# Patient Record
Sex: FEMALE | Race: WHITE | NOT HISPANIC OR LATINO | Employment: FULL TIME | URBAN - METROPOLITAN AREA
[De-identification: names, ages, dates, MRNs, and addresses within clinical notes are randomized per-mention and may not be internally consistent; named-entity substitution may affect disease eponyms.]

---

## 2017-09-05 ENCOUNTER — HOSPITAL ENCOUNTER (OUTPATIENT)
Dept: MAMMOGRAPHY | Facility: CLINIC | Age: 47
Discharge: HOME | End: 2017-09-05
Attending: OBSTETRICS & GYNECOLOGY | Admitting: OBSTETRICS & GYNECOLOGY
Payer: COMMERCIAL

## 2017-09-05 DIAGNOSIS — Z12.31 ENCOUNTER FOR SCREENING MAMMOGRAM FOR HIGH-RISK PATIENT: ICD-10-CM

## 2017-09-05 PROCEDURE — G0202 SCR MAMMO BI INCL CAD: HCPCS

## 2017-09-19 ENCOUNTER — HOSPITAL ENCOUNTER (OUTPATIENT)
Dept: MAMMOGRAPHY | Facility: CLINIC | Age: 47
Discharge: HOME | End: 2017-09-19
Attending: OBSTETRICS & GYNECOLOGY | Admitting: OBSTETRICS & GYNECOLOGY
Payer: COMMERCIAL

## 2017-09-19 ENCOUNTER — HOSPITAL ENCOUNTER (OUTPATIENT)
Dept: ULTRASOUND IMAGING | Facility: CLINIC | Age: 47
End: 2017-09-19
Attending: OBSTETRICS & GYNECOLOGY
Payer: COMMERCIAL

## 2017-09-19 DIAGNOSIS — R92.8 ABNORMAL MAMMOGRAM: ICD-10-CM

## 2017-09-19 PROCEDURE — G0206 DX MAMMO INCL CAD UNI: HCPCS

## 2017-09-19 PROCEDURE — 76642 ULTRASOUND BREAST LIMITED: CPT | Mod: RT

## 2018-08-28 ENCOUNTER — RADIANT APPOINTMENT (OUTPATIENT)
Dept: GENERAL RADIOLOGY | Facility: CLINIC | Age: 48
End: 2018-08-28
Attending: INTERNAL MEDICINE
Payer: COMMERCIAL

## 2018-08-28 ENCOUNTER — OFFICE VISIT (OUTPATIENT)
Dept: INTERNAL MEDICINE | Facility: CLINIC | Age: 48
End: 2018-08-28
Payer: COMMERCIAL

## 2018-08-28 VITALS
DIASTOLIC BLOOD PRESSURE: 70 MMHG | BODY MASS INDEX: 34.36 KG/M2 | RESPIRATION RATE: 16 BRPM | HEART RATE: 70 BPM | HEIGHT: 63 IN | TEMPERATURE: 98.9 F | OXYGEN SATURATION: 98 % | WEIGHT: 193.9 LBS | SYSTOLIC BLOOD PRESSURE: 106 MMHG

## 2018-08-28 DIAGNOSIS — M25.571 ACUTE RIGHT ANKLE PAIN: Primary | ICD-10-CM

## 2018-08-28 DIAGNOSIS — M25.571 ACUTE RIGHT ANKLE PAIN: ICD-10-CM

## 2018-08-28 PROCEDURE — 99203 OFFICE O/P NEW LOW 30 MIN: CPT | Performed by: INTERNAL MEDICINE

## 2018-08-28 PROCEDURE — 73610 X-RAY EXAM OF ANKLE: CPT | Mod: RT

## 2018-08-28 NOTE — MR AVS SNAPSHOT
After Visit Summary   8/28/2018    Ruth Michel    MRN: [id number]           Patient Information     Date Of Birth          1970        Visit Information        Provider Department      8/28/2018 3:15 PM Guerda Batres MD [facility]        Today's Diagnoses     Acute left ankle pain    -  1      Care Instructions    Hard boot - please wear while weight bearing for next 1-2 weeks (unless otherwise specified).    Xrays downstairs today.           Follow-ups after your visit        Future tests that were ordered for you today     Open Future Orders        Priority Expected Expires Ordered    XR Ankle Left G/E 3 Views Routine 8/28/2018 8/28/2019 8/28/2018            Who to contact     If you have questions or need follow up information about today's clinic visit or your schedule please contact [facility] directly at [phone].  Normal or non-critical lab and imaging results will be communicated to you by [employer], letter or phone within 4 business days after the clinic has received the results. If you do not hear from us within 7 days, please contact the clinic through [employer] or phone. If you have a critical or abnormal lab result, we will notify you by phone as soon as possible.  Submit refill requests through [employer] or call your pharmacy and they will forward the refill request to us. Please allow 3 business days for your refill to be completed.          Additional Information About Your Visit        MyChart Information     [employer] gives you secure access to your electronic health record. If you see a primary care provider, you can also send messages to your care team and make appointments. If you have questions, please call your primary care clinic.  If you do not have a primary care provider, please call [phone] and they will assist you.        Care EveryWhere ID     This is your Care EveryWhere ID. This could be used by other  organizations to access your [facility] records  [id number]        Your Vitals Were     Pulse Temperature Respirations Last Period Pulse Oximetry       70 98.9  F (37.2  C) (Oral) 16 07/26/2018 98%        Blood Pressure from Last 3 Encounters:   08/28/18 106/70   12/05/05 114/70   08/17/05 130/70    Weight from Last 3 Encounters:   08/28/18 193 lb 14.4 oz (88 kg)   12/05/05 146 lb 11.2 oz (66.5 kg)   05/24/05 145 lb 1.6 oz (65.8 kg)                 Today's Medication Changes          These changes are accurate as of 8/28/18  3:19 PM.  If you have any questions, ask your nurse or doctor.               Start taking these medicines.        Dose/Directions    order for DME   Used for:  Acute left ankle pain   Started by:  Guerda Batres MD        Equipment being ordered: CAM boot   Quantity:  1 Units   Refills:  0            Where to get your medicines      Some of these will need a paper prescription and others can be bought over the counter.  Ask your nurse if you have questions.     Bring a paper prescription for each of these medications     order for DME                Primary Care Provider Fax #    Physician No Ref-Primary [phone]       No address on file        Equal Access to Services     JOYCE REDDING : Esthela Mclain, wacolinda aleidaadaha, qaybta kaalmada adejulietteda, america olea. So [facility] [phone].    ATENCIÓN: Si habla español, tiene a sosa disposición servicios gratuitos de asistencia lingüística. Llame al [phone].    We comply with applicable federal civil rights laws and [place] laws. We do not discriminate on the basis of race, color, national origin, age, disability, sex, sexual orientation, or gender identity.            Thank you!     Thank you for choosing [facility]  for your care. Our goal is always to provide you with excellent care. Hearing back from our patients is one way we can continue to improve our  services. Please take a few minutes to complete the written survey that you may receive in the mail after your visit with us. Thank you!             Your Updated Medication List - Protect others around you: Learn how to safely use, store and throw away your medicines at www.disposemymeds.org.          This list is accurate as of 8/28/18  3:19 PM.  Always use your most recent med list.                   Brand Name Dispense Instructions for use Diagnosis    calcium 500 1250 (500 Ca) MG Tabs tablet   Generic drug:  calcium carbonate 500 mg {elemental}      2 daily        LEVOTHYROXINE SODIUM PO      Take 50 mcg by mouth daily        MULTIVITAMIN PO      None Entered        order for DME     1 Units    Equipment being ordered: CAM boot    Acute left ankle pain

## 2018-08-28 NOTE — PATIENT INSTRUCTIONS
Hard boot - please wear while weight bearing for next 1-2 weeks (unless otherwise specified).    Xrays downstairs today.

## 2018-08-28 NOTE — PROGRESS NOTES
"  SUBJECTIVE:                                                      HPI: Ruth Michel is a pleasant 47 year old female who presents with right ankle pain:    - started yesterday  - indicates right medial malleolus  - pain only occurs with weightbearing, not present otherwise  - was mowing the lawn on Sunday, but does not recall any misstep, twist, or trauma    No prior history of right ankle injuries, sprains, or surgeries.    PMH significant for obesity.    The medication, allergy, and problem lists have been reviewed and updated as appropriate.       OBJECTIVE:                                                      /70  Pulse 70  Temp 98.9  F (37.2  C) (Oral)  Resp 16  Ht 5' 2.5\" (1.588 m)  Wt 193 lb 14.4 oz (88 kg)  LMP 07/26/2018  SpO2 98%  BMI 34.9 kg/m2  Constitutional: well-appearing  Right ankle: mild swelling around medial malleolus, but no deformity, redness, warmth, or skin changes; mild tenderness to palpation of medial malleolus; no pain with active and passive range of motion exercises  Right tib/fib: no deformity or pain with compression    ASSESSMENT/PLAN:                                                      (M25.571) Acute right ankle pain  (primary encounter diagnosis)  Comment: did not tolerate CAM boot.  Plan:    - right ankle x-ray series today.   - patient to use crutches/remain nonweightbearing for at least 1-2 weeks.    The instructions on the AVS were discussed and explained to the patient. Patient expressed understanding of instructions.    A total of 20 minutes were spent face-to-face with this patient during this encounter and over half of that time was spent on counseling and coordination of care re: above diagnoses and plans of care.     (Chart documentation was completed, in part, with [employer] voice-recognition software. Even though reviewed, some grammatical, spelling, and word errors may remain.)    Guerda Batres MD   [place] [id number] " JOSE J [address] [place] [id number]  T: [phone], F: [phone]

## 2018-09-02 ENCOUNTER — HEALTH MAINTENANCE LETTER (OUTPATIENT)
Age: 48
End: 2018-09-02

## 2018-10-04 ENCOUNTER — HOSPITAL ENCOUNTER (OUTPATIENT)
Dept: MAMMOGRAPHY | Facility: CLINIC | Age: 48
Discharge: HOME | End: 2018-10-04
Attending: OBSTETRICS & GYNECOLOGY | Admitting: OBSTETRICS & GYNECOLOGY
Payer: COMMERCIAL

## 2018-10-04 DIAGNOSIS — Z12.39 BREAST CANCER SCREENING: ICD-10-CM

## 2018-10-04 PROCEDURE — 77067 SCR MAMMO BI INCL CAD: CPT

## 2019-10-10 ENCOUNTER — HOSPITAL ENCOUNTER (OUTPATIENT)
Dept: MAMMOGRAPHY | Facility: CLINIC | Age: 49
Discharge: HOME | End: 2019-10-10
Attending: OBSTETRICS & GYNECOLOGY | Admitting: OBSTETRICS & GYNECOLOGY
Payer: COMMERCIAL

## 2019-10-10 DIAGNOSIS — Z12.31 VISIT FOR SCREENING MAMMOGRAM: ICD-10-CM

## 2019-10-10 PROCEDURE — 77067 SCR MAMMO BI INCL CAD: CPT

## 2020-02-10 ENCOUNTER — HEALTH MAINTENANCE LETTER (OUTPATIENT)
Age: 50
End: 2020-02-10

## 2021-01-07 ENCOUNTER — HOSPITAL ENCOUNTER (OUTPATIENT)
Dept: MAMMOGRAPHY | Facility: CLINIC | Age: 51
Discharge: HOME | End: 2021-01-07
Attending: OBSTETRICS & GYNECOLOGY | Admitting: OBSTETRICS & GYNECOLOGY
Payer: COMMERCIAL

## 2021-01-07 DIAGNOSIS — Z12.31 VISIT FOR SCREENING MAMMOGRAM: ICD-10-CM

## 2021-01-07 PROCEDURE — 77067 SCR MAMMO BI INCL CAD: CPT

## 2022-03-18 ENCOUNTER — HOSPITAL ENCOUNTER (OUTPATIENT)
Dept: MAMMOGRAPHY | Facility: CLINIC | Age: 52
Discharge: HOME | End: 2022-03-18
Attending: OBSTETRICS & GYNECOLOGY | Admitting: OBSTETRICS & GYNECOLOGY
Payer: COMMERCIAL

## 2022-03-18 DIAGNOSIS — Z12.31 VISIT FOR SCREENING MAMMOGRAM: ICD-10-CM

## 2022-03-18 PROCEDURE — 77067 SCR MAMMO BI INCL CAD: CPT

## 2022-04-04 ENCOUNTER — OFFICE VISIT (OUTPATIENT)
Dept: FAMILY MEDICINE | Facility: CLINIC | Age: 52
End: 2022-04-04
Payer: COMMERCIAL

## 2022-04-04 VITALS
SYSTOLIC BLOOD PRESSURE: 114 MMHG | HEART RATE: 76 BPM | OXYGEN SATURATION: 100 % | DIASTOLIC BLOOD PRESSURE: 77 MMHG | WEIGHT: 195 LBS | TEMPERATURE: 97 F | BODY MASS INDEX: 35.1 KG/M2

## 2022-04-04 DIAGNOSIS — M25.521 RIGHT ELBOW PAIN: Primary | ICD-10-CM

## 2022-04-04 DIAGNOSIS — M65.929 TENOSYNOVITIS OF ELBOW: ICD-10-CM

## 2022-04-04 DIAGNOSIS — R29.898 RIGHT ARM WEAKNESS: ICD-10-CM

## 2022-04-04 PROCEDURE — 99203 OFFICE O/P NEW LOW 30 MIN: CPT

## 2022-04-04 RX ORDER — METHYLPREDNISOLONE 4 MG
TABLET, DOSE PACK ORAL
Qty: 21 TABLET | Refills: 0 | Status: SHIPPED | OUTPATIENT
Start: 2022-04-04

## 2022-04-04 ASSESSMENT — ENCOUNTER SYMPTOMS
WEAKNESS: 1
PARESTHESIAS: 1
MYALGIAS: 1
NUMBNESS: 1
CONSTITUTIONAL NEGATIVE: 1

## 2022-04-04 NOTE — PATIENT INSTRUCTIONS
Rest: Try and avoid any trauma or reinjury to it over the next several days, avoid impact activities, perform only light duties  Ice: Ice for 5 to 15 minutes several times throughout the first 24 to 48 hours and then after practice or activity.  I recommend using ice bucket versus an ice pack  Compression: Consider using a brace which may help you return to activity earlier or an ACE wrap  Elevation: When not using keep the injury elevated to the level of the heart    You can take ibuprofen Tylenol for the pain and swelling  Ibuprofen 600 mg (3 of the 200 mg OTC tablets or 600 mg of the children's liquid) up to 4 times daily with food or milk  Tylenol 1000 mg every 8 hours as needed      Trial elbow brace

## 2022-04-04 NOTE — PROGRESS NOTES
Assessment & Plan     Right elbow pain  - methylPREDNISolone (MEDROL DOSEPAK) 4 MG tablet therapy pack  Dispense: 21 tablet; Refill: 0  - Orthopedic  Referral    Right arm weakness  - methylPREDNISolone (MEDROL DOSEPAK) 4 MG tablet therapy pack  Dispense: 21 tablet; Refill: 0  - Orthopedic  Referral    Tenosynovitis of elbow     Referral sent to orthopedics. Take steroid as directed.     Rest: Try and avoid any trauma or reinjury to it over the next several days, avoid impact activities, perform only light duties  Ice: Ice for 5 to 15 minutes several times throughout the first 24 to 48 hours and then after practice or activity.  I recommend using ice bucket versus an ice pack  Compression: Consider using a brace which may help you return to activity earlier or an ACE wrap  Elevation: When not using keep the injury elevated to the level of the heart    You can take ibuprofen Tylenol for the pain and swelling  Ibuprofen 600 mg (3 of the 200 mg OTC tablets or 600 mg of the children's liquid) up to 4 times daily with food or milk  Tylenol 1000 mg every 8 hours as needed    Trial elbow brace    Return in about 1 week (around 4/11/2022), or if symptoms worsen or fail to improve.    Subjective     Ruth is a 51 year old female who presents to clinic today for the following health issues:  Chief Complaint   Patient presents with     Musculoskeletal Problem     Patient hurt her rigt elbow x 5 days. hurts to use it      Ruth presents with reports of right elbow pain x 1 week. She reports she works at the [employer] and was moving fixture and felt pain. She improved somewhat but lifted heavy grocery bags and felt a shot of pain. She reports she cannot open a door, bend it to perform daily functions such as brushing teeth and eating. She has a brace on           Review of Systems   Constitutional: Negative.    Musculoskeletal: Positive for myalgias.   Neurological: Positive for weakness, numbness and paresthesias.            Objective    /77   Pulse 76   Temp 97  F (36.1  C) (Tympanic)   Wt 88.5 kg (195 lb)   SpO2 100%   BMI 35.10 kg/m    Physical Exam  Constitutional:       Appearance: Normal appearance.   HENT:      Head: Normocephalic and atraumatic.   Musculoskeletal:      Left upper arm: Normal.      Right elbow: Normal.      Left elbow: Decreased range of motion. Tenderness present in lateral epicondyle.      Right forearm: Normal.      Left forearm: Normal.      Right wrist: Normal.      Left wrist: Normal.      Cervical back: Normal range of motion and neck supple.   Skin:     General: Skin is warm and dry.   Neurological:      General: No focal deficit present.      Mental Status: She is alert and oriented to person, place, and time.   Psychiatric:         Mood and Affect: Mood normal.         Behavior: Behavior normal.         Thought Content: Thought content normal.         Judgment: Judgment normal.              Burke Lafleur PA-C

## 2023-09-08 ENCOUNTER — HOSPITAL ENCOUNTER (OUTPATIENT)
Dept: MAMMOGRAPHY | Facility: CLINIC | Age: 53
Discharge: HOME | End: 2023-09-08
Attending: OBSTETRICS & GYNECOLOGY | Admitting: OBSTETRICS & GYNECOLOGY
Payer: COMMERCIAL

## 2023-09-08 DIAGNOSIS — Z12.31 BREAST CANCER SCREENING BY MAMMOGRAM: ICD-10-CM

## 2023-09-08 PROCEDURE — 77067 SCR MAMMO BI INCL CAD: CPT

## 2024-09-19 ENCOUNTER — HOSPITAL ENCOUNTER (OUTPATIENT)
Dept: MAMMOGRAPHY | Facility: CLINIC | Age: 54
Discharge: HOME | End: 2024-09-19
Attending: OBSTETRICS & GYNECOLOGY | Admitting: OBSTETRICS & GYNECOLOGY
Payer: COMMERCIAL

## 2024-09-19 DIAGNOSIS — Z12.31 VISIT FOR SCREENING MAMMOGRAM: ICD-10-CM

## 2024-09-19 PROCEDURE — 77063 BREAST TOMOSYNTHESIS BI: CPT
